# Patient Record
Sex: MALE | Race: WHITE | NOT HISPANIC OR LATINO | Employment: FULL TIME | URBAN - METROPOLITAN AREA
[De-identification: names, ages, dates, MRNs, and addresses within clinical notes are randomized per-mention and may not be internally consistent; named-entity substitution may affect disease eponyms.]

---

## 2022-10-28 ENCOUNTER — OFFICE VISIT (OUTPATIENT)
Dept: SPORTS MEDICINE | Facility: CLINIC | Age: 27
End: 2022-10-28

## 2022-10-28 VITALS
DIASTOLIC BLOOD PRESSURE: 79 MMHG | BODY MASS INDEX: 26.48 KG/M2 | HEART RATE: 82 BPM | TEMPERATURE: 98.6 F | SYSTOLIC BLOOD PRESSURE: 127 MMHG | HEIGHT: 70 IN | OXYGEN SATURATION: 97 % | WEIGHT: 185 LBS

## 2022-10-28 DIAGNOSIS — M25.661 DECREASED RANGE OF MOTION (ROM) OF RIGHT KNEE: ICD-10-CM

## 2022-10-28 DIAGNOSIS — M25.461 KNEE EFFUSION, RIGHT: ICD-10-CM

## 2022-10-28 DIAGNOSIS — M25.561 ACUTE PAIN OF RIGHT KNEE: Primary | ICD-10-CM

## 2022-10-28 PROCEDURE — 99203 OFFICE O/P NEW LOW 30 MIN: CPT | Performed by: STUDENT IN AN ORGANIZED HEALTH CARE EDUCATION/TRAINING PROGRAM

## 2022-10-28 NOTE — PROGRESS NOTES
"  Chief Complaint   Patient presents with   • Right Knee - Initial Evaluation       History of Present Illness  Stephen is a 27 y.o. year old male here today accompanied by his mom for right knee pain.  Injury occurred 10/27/2022 while playing basketball.  He is unsure of the exact mechanism but remembers running into another player.  He heard a pop and fell to the floor.  He had immediate pain and was unable to keep playing. Pain is currently rated 6/10 and described as a stabbing, shooting, and aching pain. Pain is located on the lateral aspect. Admits to associated tightness, stiffness, swelling, and bruising. He tried walking on it but feels like it is giving out.  Pain worsens with weight bearing, and even notices pain while lying down. He denies any numbness or tingling. Has been nonweightbearing on crutches and taking ibuprofen as needed.  He has a history significant for previous bilateral knee surgery for OCD about 15 years ago.  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    Review of Systems   All other systems reviewed and are negative.      /79 (BP Location: Left arm)   Pulse 82   Temp 98.6 °F (37 °C) (Temporal)   Ht 177.8 cm (70\")   Wt 83.9 kg (185 lb)   SpO2 97%   BMI 26.54 kg/m²        Physical Exam  Vital signs reviewed.   General: Well developed, well nourished; No acute distress.  Eyes: conjunctiva clear; pupils equally round and reactive  ENT: external ears and nose atraumatic; hearing normal  CV: no peripheral edema, 2+ distal pulses  Resp: normal respiratory effort, no use of accessory muscles  Skin: normal color and pigmentation; no rashes or wounds; normal turgor  Psych: alert and oriented; mood and affect appropriate; recent and remote memory intact  Neuro: sensation to light touch intact    MSK Exam:  The right knee is without obvious signs of acute bony deformity, there is a focal area of ecchymosis on the superior lateral aspect as well as " diffuse subtle ecchymosis on the medial aspect.  There is a significant joint effusion noted on visual inspection and confirmed with palpation. The patella is without tenderness. Apprehension is negative with medial and lateral glide. The medial joint line is nontender.  The lateral joint line has significant tenderness without bony crepitus or step-off. Soft tissue including the distal hamstring tendons, pes anserine, quad tendon, patellar tendon, proximal gastroc tendon, distal IT band, and Gerdy's tubercle are nontender.  Knee is kept in roughly 20 degrees of extension and still limited at maximum active extension to 10 degrees.  He has significant pain and limitations with flexion.  When supine will only get to roughly 60 degrees of flexion before significant guarding.  While seated is able to get to 80 degrees of flexion.  Special tests are limited due to guarding.  He has significant pain and guarding with attempted Lachman's and anterior drawer.  Varus & valgus stress with lateral joint line pain.  Unable to perform North's due to limitations with flexion and significant pain and guarding.  Unable to weight-bear.  Ambulating with use of crutches.    Right Knee X-Ray  Indication: Pain  Views: AP, Lateral, and [place]  Findings:  No acute fracture, dislocation, or malalignment  No bony lesion  Normal soft tissues  Large effusion  No prior studies were available for comparison.      Assessment and Plan  Diagnoses and all orders for this visit:    1. Acute pain of right knee (Primary)  -     MRI Knee Right Without Contrast; Future    2. Knee effusion, right  -     MRI Knee Right Without Contrast; Future    3. Decreased range of motion (ROM) of right knee  -     MRI Knee Right Without Contrast; Future    Patient is a 27-year-old male with acute right-sided knee pain after an injury while playing basketball yesterday.  He reports feeling a pop and has had feelings of giving way.  That combined with his  significant effusion, I am concerned about an occult fracture or internal derangement and therefore recommended MRI to further evaluate.  He should remain nonweightbearing with use the crutches.  He should ice and elevate above the level of his heart regularly to help with swelling.  He was also provided an Ace bandage for compression.  He may use over-the-counter Tylenol as needed for pain.  Stressed the importance of maintaining good extension to avoid loss of motion.  We discussed his capabilities and limitations at work.  He is concerned that this is a new job that he just started 1 month ago.  A note was provided allowing him to return to work light duty with strict instructions that he should remain nonweightbearing on the crutches.  He will follow-up 1 to 2 days after his MRI to review images.  Additional recommendations will be made at that time.  All of his and his mother's questions were answered and they are agreeable with the plan.      Dictated utilizing Dragon dictation.

## 2022-11-02 ENCOUNTER — PATIENT ROUNDING (BHMG ONLY) (OUTPATIENT)
Dept: SPORTS MEDICINE | Facility: CLINIC | Age: 27
End: 2022-11-02

## 2022-11-02 ENCOUNTER — APPOINTMENT (OUTPATIENT)
Dept: MRI IMAGING | Facility: HOSPITAL | Age: 27
End: 2022-11-02

## 2022-11-02 NOTE — PROGRESS NOTES
November 2, 2022    A Welcome Card has been sent to the patient for PATIENT ROUNDING with [facility]

## 2022-11-04 ENCOUNTER — OFFICE VISIT (OUTPATIENT)
Dept: SPORTS MEDICINE | Facility: CLINIC | Age: 27
End: 2022-11-04

## 2022-11-04 VITALS — HEIGHT: 75 IN | TEMPERATURE: 99.1 F | WEIGHT: 185 LBS | BODY MASS INDEX: 23 KG/M2

## 2022-11-04 DIAGNOSIS — S82.141A CLOSED FRACTURE OF RIGHT TIBIAL PLATEAU, INITIAL ENCOUNTER: Primary | ICD-10-CM

## 2022-11-04 DIAGNOSIS — M25.561 ACUTE PAIN OF RIGHT KNEE: ICD-10-CM

## 2022-11-04 PROCEDURE — 99213 OFFICE O/P EST LOW 20 MIN: CPT | Performed by: STUDENT IN AN ORGANIZED HEALTH CARE EDUCATION/TRAINING PROGRAM

## 2022-11-04 RX ORDER — TRAMADOL HYDROCHLORIDE 50 MG/1
50 TABLET ORAL EVERY 6 HOURS PRN
Qty: 12 TABLET | Refills: 0 | Status: SHIPPED | OUTPATIENT
Start: 2022-11-04

## 2022-11-09 NOTE — PROGRESS NOTES
New Right Knee      Patient: Stephen Matamoros        YOB: 1995    Medical Record Number: [id number]        Chief Complaints: right knee pain      History of Present Illness: This is a this is a 27-year-old male who injured his right knee playing basketball on 10/27/2022 he states he and a teammate collided he got a knee to the lateral aspect of his right knee he felt a pop had immediate pain and inability to bear weight.  He was seen x-rays were done which showed a questionable lateral tibial plateau fracture an MRI was done which confirmed that.  He does have a little depression of 1 particular area on the lateral tibial plateau it extends lateral and anterior.  His symptoms are significant mostly at night he is off work in a job he just started past medical history is unremarkable symptoms are moderate to severe      Allergies: No Known Allergies    Medications:   Home Medications:  Current Outpatient Medications on File Prior to Visit   Medication Sig   • traMADol (ULTRAM) 50 MG tablet Take 1 tablet by mouth Every 6 (Six) Hours As Needed for Severe Pain.     No current facility-administered medications on file prior to visit.     Current Medications:  Scheduled Meds:  Continuous Infusions:No current facility-administered medications for this visit.    PRN Meds:.    History reviewed. No pertinent past medical history.     Past Surgical History:   Procedure Laterality Date   • KNEE SURGERY Bilateral 2006        Social History     Occupational History     Employer: [employer]   Tobacco Use   • Smoking status: Never     Passive exposure: Never   • Smokeless tobacco: Current     Types: Chew   Vaping Use   • Vaping Use: Never used   Substance and Sexual Activity   • Alcohol use: Yes     Comment: social   • Drug use: Never   • Sexual activity: Defer      Social History     Social History Narrative   • Not on file      History reviewed. No pertinent family history.          Review of Systems:  "    Review of Systems      Physical Exam: 27 y.o. male  General Appearance:    Alert, cooperative, in no acute distress                   Vitals:    11/10/22 0806   Temp: 97.8 °F (36.6 °C)   Weight: 83.9 kg (185 lb)   Height: 177.8 cm (70\")   PainSc:   2      Patient is alert and read ×3 no acute distress appears her above-listed at height weight and age.  Affect is normal respiratory rate is normal unlabored. Heart rate regular rate rhythm, sclera, dentition and hearing are normal for the purpose of this exam.        Ortho Exam exam of the right knee he has a severe effusion he has tenderness over the lateral tibial plateau really was not able to assess ligamentous structures due to the acuteness of his symptoms but we do see those well on MRI.  He is neurologically intact he does have some swelling down the calf but no signs or symptoms of DVT    Procedures             Radiology:   AP, Lateral and merchant views of the right knee  were /reviewed to evauateknee pain.  You do appreciate changes in the lateral tibial plateau.  He also has an MRI which shows a lateral tibial plateau fracture with some depression particularly in one area it is depressed approximately 8 mm.  Ligamentous structures appear intact  Imaging Results (Most Recent)     Procedure Component Value Units Date/Time    XR Knee 1 or 2 View Right [[id number]] Resulted: 11/10/22 0755     Updated: 11/10/22 0755    Impression:      Ordering physician's impression is located in the Encounter Note dated 11/10/22. X-ray performed in the DR room.          Assessment/Plan:      Right lateral tibial plateau fracture I would like to get a CT scan to really evaluate how depressed this is to see if this needs surgical intervention.  In the meantime I will have him remain nonweightbearing he has not been icing I think that will help his symptoms he is only had tramadol I will give him a few hydrocodone with strict precautions we will get the CT scan he will remain " off work he understands hydrocodone is a narcotic and has addictive traits and he will watch the number that he takes and we will also watch this

## 2022-11-10 ENCOUNTER — OFFICE VISIT (OUTPATIENT)
Dept: ORTHOPEDIC SURGERY | Facility: CLINIC | Age: 27
End: 2022-11-10

## 2022-11-10 ENCOUNTER — TELEPHONE (OUTPATIENT)
Dept: ORTHOPEDIC SURGERY | Facility: CLINIC | Age: 27
End: 2022-11-10

## 2022-11-10 VITALS — WEIGHT: 185 LBS | TEMPERATURE: 97.8 F | HEIGHT: 70 IN | BODY MASS INDEX: 26.48 KG/M2

## 2022-11-10 DIAGNOSIS — M25.561 RIGHT KNEE PAIN, UNSPECIFIED CHRONICITY: Primary | ICD-10-CM

## 2022-11-10 DIAGNOSIS — S82.141A CLOSED FRACTURE OF RIGHT TIBIAL PLATEAU, INITIAL ENCOUNTER: Primary | ICD-10-CM

## 2022-11-10 DIAGNOSIS — S82.141A CLOSED FRACTURE OF RIGHT TIBIAL PLATEAU, INITIAL ENCOUNTER: ICD-10-CM

## 2022-11-10 PROCEDURE — 99204 OFFICE O/P NEW MOD 45 MIN: CPT | Performed by: ORTHOPAEDIC SURGERY

## 2022-11-10 PROCEDURE — 73560 X-RAY EXAM OF KNEE 1 OR 2: CPT | Performed by: ORTHOPAEDIC SURGERY

## 2022-11-10 RX ORDER — HYDROCODONE BITARTRATE AND ACETAMINOPHEN 5; 325 MG/1; MG/1
1 TABLET ORAL EVERY 4 HOURS PRN
Qty: 45 TABLET | Refills: 0 | Status: SHIPPED | OUTPATIENT
Start: 2022-11-10

## 2022-11-10 RX ORDER — HYDROCODONE BITARTRATE AND ACETAMINOPHEN 5; 325 MG/1; MG/1
1 TABLET ORAL EVERY 6 HOURS PRN
Qty: 45 TABLET | Refills: 0 | Status: SHIPPED | OUTPATIENT
Start: 2022-11-10

## 2022-11-10 NOTE — TELEPHONE ENCOUNTER
I spoke with the pharmacy they are requiring a new prescription with the correct instructions, sending asap toyou

## 2022-11-10 NOTE — TELEPHONE ENCOUNTER
These are both on the prescription: Sig: Take 1 tablet by mouth Every 6 (Six) Hours As Needed for Severe Pain. 1-2 oral Q4H PRN severe pain    Please advise as to which you would like. Thank you

## 2022-11-10 NOTE — TELEPHONE ENCOUNTER
Caller: SHAQ   Relationship to Patient: KEDAR     Phone Number: [phone]  Reason for Call:  QUESTIONS ABOUT THE MEDICATION THAT WAS SENT OVER BECAUSE THEIR ARE 2 SETS OF DOSING INFORMATION

## 2022-11-11 ENCOUNTER — DOCUMENTATION (OUTPATIENT)
Dept: ORTHOPEDIC SURGERY | Facility: CLINIC | Age: 27
End: 2022-11-11

## 2022-11-11 ENCOUNTER — TELEPHONE (OUTPATIENT)
Dept: ORTHOPEDIC SURGERY | Facility: CLINIC | Age: 27
End: 2022-11-11

## 2022-11-11 NOTE — TELEPHONE ENCOUNTER
Spoke with patient.      As per Dr. Corley, no surgery at this time.  Made follow up appt .  Patient voiced understanding

## 2022-11-11 NOTE — TELEPHONE ENCOUNTER
HUB AGENT ATTEMPTED CLINICAL WARM TRANSFER - NO ANSWER     Caller: Stephen Matamoros    Relationship: Self    Best call back number: [phone]     Caller requesting test results: PATIENT / SELF     What test was performed: RIGHT KNEE CT     When was the test performed: YESTERDAY 11-10-22     Where was the test performed: [facility] - REPORT IN MEDIA (& PATIENT HAS DISC)     Additional notes: PATIENT RETURNED MISSED CALL FROM DR MARMOLEJO FROM [facility] THIS MORNING 11-11-22 RE: RIGHT KNEE CT RESULTS (PLEASE SEE PRIOR CLINICAL DOCUMENTATION)     PLEASE CALL / LEAVE VMAIL x2 TO NOTIFY / DISCUSS CT RESULTS w/PATIENT     THANKS

## 2022-11-11 NOTE — PROGRESS NOTES
I spoke with radiology late last night regarding his CT scan.  It is pretty much as billed on the MRI.  He has about a maximum of 3 mm depression of that lateral tibial plateau fracture lateral meniscus other ligamentous structures all look fine

## 2022-11-28 ENCOUNTER — OFFICE VISIT (OUTPATIENT)
Dept: ORTHOPEDIC SURGERY | Facility: CLINIC | Age: 27
End: 2022-11-28

## 2022-11-28 VITALS — HEIGHT: 70 IN | TEMPERATURE: 98.1 F | BODY MASS INDEX: 25.77 KG/M2 | WEIGHT: 180 LBS

## 2022-11-28 DIAGNOSIS — S82.141D CLOSED FRACTURE OF RIGHT TIBIAL PLATEAU WITH ROUTINE HEALING, SUBSEQUENT ENCOUNTER: Primary | ICD-10-CM

## 2022-11-28 PROCEDURE — 99212 OFFICE O/P EST SF 10 MIN: CPT | Performed by: ORTHOPAEDIC SURGERY

## 2022-11-28 RX ORDER — CLINDAMYCIN PHOSPHATE 11.9 MG/ML
SOLUTION TOPICAL SEE ADMIN INSTRUCTIONS
COMMUNITY
Start: 2022-11-23

## 2022-11-28 RX ORDER — DOXYCYCLINE HYCLATE 100 MG/1
CAPSULE ORAL
COMMUNITY
Start: 2022-11-23

## 2022-11-28 NOTE — PROGRESS NOTES
Patient: Stephen Matamoros  YOB: 1995  Date of Service: 11/28/2022    Chief Complaints:   Chief Complaint   Patient presents with   • Right Knee - Follow-up, Pain       Subjective:    History of Present Illness: Pt is seen in the office today with complaints of right knee pain he is here for follow-up of his CT scan which demonstrates a 2 x 1.5 minimally comminuted minimally depressed lateral tibial plateau fracture he states he doing much better his knee feels better quads are waking up swelling is down he is in his brace and nonweightbearing it has been 4 weeks  Chief Complaint   Patient presents with   • Right Knee - Follow-up, Pain   .          Allergies: No Known Allergies    Medications:   Home Medications:  Current Outpatient Medications on File Prior to Visit   Medication Sig   • clindamycin (CLEOCIN T) 1 % external solution Apply  topically to the appropriate area as directed See Admin Instructions. Apply topically to the affected area twice daily   • doxycycline (VIBRAMYCIN) 100 MG capsule TAKE 1 CAPSULE BY MOUTH TWICE DAILY WITH A FULL GLASS OF WATER. DO NOT LIE DOWN UNTIL 1 HOUR AFTER TAKING   • traMADol (ULTRAM) 50 MG tablet Take 1 tablet by mouth Every 6 (Six) Hours As Needed for Severe Pain.   • HYDROcodone-acetaminophen (NORCO) 5-325 MG per tablet Take 1 tablet by mouth Every 6 (Six) Hours As Needed for Severe Pain. 1-2 oral Q4H PRN severe pain   • HYDROcodone-acetaminophen (NORCO) 5-325 MG per tablet Take 1 tablet by mouth Every 4 (Four) Hours As Needed for Severe Pain.     No current facility-administered medications on file prior to visit.     Current Medications:  Scheduled Meds:  Continuous Infusions:No current facility-administered medications for this visit.    PRN Meds:.    I have reviewed the patient's medical history in detail and updated the computerized patient record.  Review and summarization of old records include:    History reviewed. No pertinent past medical  history.     Past Surgical History:   Procedure Laterality Date   • KNEE SURGERY Bilateral 2006        Social History     Occupational History     Employer: [employer]   Tobacco Use   • Smoking status: Never     Passive exposure: Never   • Smokeless tobacco: Current     Types: Chew   Vaping Use   • Vaping Use: Never used   Substance and Sexual Activity   • Alcohol use: Yes     Comment: social   • Drug use: Never   • Sexual activity: Defer      Social History     Social History Narrative   • Not on file      History reviewed. No pertinent family history.    ROS: 14 point review of systems was performed and was negative except for documented findings in HPI and today's encounter.     Allergies: No Known Allergies  Constitutional:  Denies fever, shaking or chills   Eyes:  Denies change in visual acuity   HENT:  Denies nasal congestion or sore throat   Respiratory:  Denies cough or shortness of breath   Cardiovascular:  Denies chest pain or severe LE edema   GI:  Denies abdominal pain, nausea, vomiting, bloody stools or diarrhea   Musculoskeletal:  Numbness, tingling, or loss of motor function only as noted above in history of present illness.  : Denies painful urination or hematuria  Integument:  Denies rash, lesion or ulceration   Neurologic:  Denies headache or focal weakness  Endocrine:  Denies lymphadenopathy  Psych:  Denies confusion or change in mental status   Hem:  Denies active bleeding      Physical Exam: 27 y.o. male  Wt Readings from Last 3 Encounters:   11/28/22 81.6 kg (180 lb)   11/10/22 83.9 kg (185 lb)   11/04/22 83.9 kg (185 lb)       Body mass index is 25.83 kg/m².  Facility age limit for growth percentiles is 20 years.  Vitals:    11/28/22 0849   Temp: 98.1 °F (36.7 °C)     Vital signs reviewed.   General Appearance:    Alert, cooperative, in no acute distress                    Ortho exam    Exam of his knee no effusion no redness did open his brace he can flex to about 80 degrees with his  knee fully extended his quads will contract         CT is as above have reviewed and agree    Assessment: Right knee lateral tibial plateau fracture I think he can open up his brace and start moving this I still want no weight weightbearing for 2-3 weeks I will see him back at that time he can start some quad sets I do not want open chain knee extension which I discussed with him    Plan:   Follow up as indicated.  Ice, elevate, and rest as needed.  Discussed conservative measures of pain control including ice, bracing.  He can come out of his brace at night he can get back to the gym working on upper extremity  Gabykarl Corley M.D.

## 2022-12-22 ENCOUNTER — OFFICE VISIT (OUTPATIENT)
Dept: ORTHOPEDIC SURGERY | Facility: CLINIC | Age: 27
End: 2022-12-22

## 2022-12-22 VITALS — WEIGHT: 185.1 LBS | BODY MASS INDEX: 26.5 KG/M2 | TEMPERATURE: 97.6 F | HEIGHT: 70 IN

## 2022-12-22 DIAGNOSIS — S82.141D CLOSED FRACTURE OF RIGHT TIBIAL PLATEAU WITH ROUTINE HEALING, SUBSEQUENT ENCOUNTER: Primary | ICD-10-CM

## 2022-12-22 PROCEDURE — 99213 OFFICE O/P EST LOW 20 MIN: CPT | Performed by: ORTHOPAEDIC SURGERY

## 2022-12-22 PROCEDURE — 73560 X-RAY EXAM OF KNEE 1 OR 2: CPT | Performed by: ORTHOPAEDIC SURGERY

## 2022-12-22 NOTE — PROGRESS NOTES
"Knee Follow Up      Patient: Stephen Matamoros        YOB: 1995            Chief Complaints: knee pain right      History of Present Illness: Patient is here follow-up her right knee with a lateral tibial plateau fracture he states doing great he really has no pain he is working on range of motion still abiding by his nonweightbearing restrictions its been 8 weeks      Physical Exam: 27 y.o. male  General Appearance:    Alert, cooperative, in no acute distress                   Vitals:    12/22/22 0853   Temp: 97.6 °F (36.4 °C)   TempSrc: Temporal   Weight: 84 kg (185 lb 1.6 oz)   Height: 177.8 cm (70\")        Patient is alert and read ×3 no acute distress appears her above-listed at height weight and age.  Affect is normal respiratory rate is normal unlabored. Heart rate regular rate rhythm, sclera, dentition and hearing are normal for the purpose of this exam.      Ortho Exam     Exam of the right knee no effusion no redness she has good range of motion quads are actually quite good for the stage no tenderness laterally      X-rays AP and lateral of the right knee were taken to evaluate his fracture and compared to previous films despite the fact that I know the fractures are really do not appreciate any acute obvious differences    Assessment/Plan:    Right lateral tibial plateau fracture plan is for him to start progressing his weightbearing over the course of 2 weeks he can wean out of his brace I still do not want running working I will see him back in 4 weeks will not need an x-ray        " Declined

## 2022-12-30 ENCOUNTER — TREATMENT (OUTPATIENT)
Dept: PHYSICAL THERAPY | Facility: CLINIC | Age: 27
End: 2022-12-30

## 2022-12-30 DIAGNOSIS — R26.9 GAIT DISTURBANCE: ICD-10-CM

## 2022-12-30 DIAGNOSIS — R26.89 BALANCE PROBLEM: ICD-10-CM

## 2022-12-30 DIAGNOSIS — R29.898 WEAKNESS OF RIGHT LEG: ICD-10-CM

## 2022-12-30 DIAGNOSIS — S82.141A CLOSED FRACTURE OF RIGHT TIBIAL PLATEAU, INITIAL ENCOUNTER: Primary | ICD-10-CM

## 2022-12-30 PROCEDURE — 97161 PT EVAL LOW COMPLEX 20 MIN: CPT | Performed by: PHYSICAL THERAPIST

## 2022-12-30 NOTE — PROGRESS NOTES
"Physical Therapy Initial Evaluation and Plan of Care      Patient: Stephen Matamoros   : 1995  Diagnosis/ICD-10 Code:  Closed fracture of right tibial plateau, initial encounter [S82.141A]  Referring practitioner: Gaby Corley MD  Date of Initial Visit: 2022  Today's Date: 2022  Patient seen for 1 sessions         Visit Diagnoses:    ICD-10-CM ICD-9-CM   1. Closed fracture of right tibial plateau with routine healing  S82.141A 823.00   2. Weakness of right leg  R29.898 729.89   3. Balance problem  R26.89 781.99   4. Gait disturbance  R26.9 781.2         Subjective Evaluation    History of Present Illness  Mechanism of injury: Suresh was playing basketball on 10/27/2022, he planted his foot to get a rebound and another player hit the side of the R knee, pain immediately.    He had imaging done, found the lateral tibial plateua fracture, has been NWB for 8 weeks.    He had a follow up with Dr. Corley.  Per Dr. Corley's note on 22, \"Right lateral tibial plateau fracture plan is for him to start progressing his weightbearing over the course of 2 weeks he can wean out of his brace I still do not want running working\"    Suresh notes the R knee feels unstable, has a lot of his strength.  His pain levels are tolerable, only really noting pain at the end of the day or after long periods of WB.  He has no pain with WBAT.  He is still off work.     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 Pain  Quality: dull ache  Relieving factors: rest  Aggravating factors: stairs (standing or walking long distances, at the end of the day, coming down stairs)    Social Support  Lives in: multiple-level home  Lives with: parents (sibling)    Hand dominance: right    Diagnostic Tests  X-ray: abnormal  MRI studies: abnormal  CT scan: abnormal    Treatments  Previous treatment: physical therapy  Patient Goals  Patient goals for therapy: decreased pain, improved balance, increased " motion, increased strength, independence with ADLs/IADLs, return to sport/leisure activities and return to work             Objective           General Comments     Knee Comments  R knee AROM  Flexion: 125 degrees  Extension: 0 degrees    R knee MMT  Flexion: 4-/5  Extension: 4-/5    R hip MMT  Flexion: 4/5  Extension: 4/5  Abduction: 4/5  Adduction: 4+/5    WB Status: WBAT and patient is not using an assistive device, slight limp/decrease WB on the RLE    Swelling: mild swelling around the lateral patella, non pitting     Radicular Symptoms: tingling in the bottom of the R foot since he started WBAT, progressively getting better though    Tenderness: joint line lateral on the R         Assessment & Plan     Assessment  Impairments: abnormal gait, abnormal or restricted ROM, activity intolerance, impaired balance, impaired physical strength, lacks appropriate home exercise program and pain with function  Functional Limitations: lifting, sleeping, walking, uncomfortable because of pain, sitting, standing, stooping and unable to perform repetitive tasks  Assessment details: Pt presents with limitations, noted below, that impede his ability to perform work duties, running, hobbies, squatting, walking long distances, stair navigation going down. The skills of a therapist will be required to safely and effectively implement the following treatment plan to restore maximal level of function.       Goals  Plan Goals: KNEE PROBLEMS:   1. Mobility: Walking/Moving Around Functional Limitation     LTG 1: 12 weeks:  The patient will demonstrate limitation by achieving a score of 60/80 on the LEFS.    STATUS:  New   STG 1a: 4 weeks:  The patient will demonstrate limitation by achieving a score of 45/80 on the LEFS.      STATUS:  New   TREATMENT:  Manual therapy, therapeutic exercise, home exercise instruction, and modalities as needed.    2. The patient has limited strength of the right knee.   LTG 2: 12 weeks: The patient will  demonstrate 5 /5 strength for right knee flexion and extension in order to allow patient improved joint stability    STATUS:  New   STG 2a: 4 weeks: The patient will demonstrate 4+ /5 strength for right knee flexion and extension    STATUS:  New   STG2b:  4 weeks:  The patient will be independent with home exercises.     STATUS:  New   TREATMENT: Manual therapy, therapeutic exercise, home exercise instruction, aquatic therapy, and modalities as needed to include:  moist heat, electrical stimulation, ultrasound, and ice.     3. The patient has gait dysfunction.   LTG 3: 12 weeks:  The patient will ambulate without assistive device, independently, for community distances with minimal limp to the right lower extremity in order to improve mobility and allow patient to perform activities such as grocery shopping with greater ease.    STATUS:  New   TREATMENT: Gait training, aquatic therapy, therapeutic exercise, and home exercise instruction.          Plan  Therapy options: will be seen for skilled therapy services  Planned modality interventions: cryotherapy, dry needling, electrical stimulation/Russian stimulation, TENS and thermotherapy (hydrocollator packs)  Planned therapy interventions: ADL retraining, balance/weight-bearing training, body mechanics training, flexibility, functional ROM exercises, gait training, home exercise program, IADL retraining, joint mobilization, manual therapy, neuromuscular re-education, postural training, soft tissue mobilization, strengthening, stretching and therapeutic activities  Frequency: 2x week  Duration in weeks: 12  Treatment plan discussed with: patient  Plan details: Create an HEP, update as needed.    Progress with R knee/LE strength, trunk control/postural awareness, balance and gait training, coordination, increased stamina, decreased tightness, improved ROM/flexibility, education as needed.            History # of Personal Factors and/or Comorbidities: MODERATE  (1-2)  Examination of Body System(s): # of elements: LOW (1-2)  Clinical Presentation: STABLE   Clinical Decision Making: LOW     Timed:  Manual Therapy:         mins  09812;  Therapeutic Exercise:    4     mins  16067;     Neuromuscular Palomo:        mins  34959;    Therapeutic Activity:          mins  99819;     Gait Training:           mins  96996;     Ultrasound:          mins  91986;    Canalith Repos           ___  mins  91349      Untimed:  Electrical Stimulation:         mins  19699 ( );  Mechanical Traction:        mins  37518;   Dry Needling:                     mins self pay  Low Eval:                      30     mins  60658;  Medium Eval:                     mins  76345;   High Eval:                          mins  65190       Timed Treatment:   4   mins   Total Treatment:     34   mins    PT SIGNATURE: Claudia Bond PT, DPT  [place] License: [id number]  Electronically signed by Claudia Bond PT, 12/30/22, 7:45 AM EST      Initial Certification    Certification Period: 12/30/2022 thru 3/29/2023  I certify that the therapy services are furnished while this patient is under my care.  The services outlined above are required by this patient, and will be reviewed every 90 days.     PHYSICIAN: Gaby Corley MD  NPI: [id number]            PHYSICIAN PRINT NAME: ______________________________________________      PHYSICIAN SIGNATURE: ______________________________________________         DATE:________________________________        Please sign and return via fax to [phone].  Thank you, [facility].

## 2023-01-04 ENCOUNTER — TREATMENT (OUTPATIENT)
Dept: PHYSICAL THERAPY | Facility: CLINIC | Age: 28
End: 2023-01-04
Payer: COMMERCIAL

## 2023-01-04 DIAGNOSIS — R26.9 GAIT DISTURBANCE: ICD-10-CM

## 2023-01-04 DIAGNOSIS — S82.141A CLOSED FRACTURE OF RIGHT TIBIAL PLATEAU, INITIAL ENCOUNTER: Primary | ICD-10-CM

## 2023-01-04 DIAGNOSIS — R29.898 WEAKNESS OF RIGHT LEG: ICD-10-CM

## 2023-01-04 DIAGNOSIS — R26.89 BALANCE PROBLEM: ICD-10-CM

## 2023-01-04 PROCEDURE — 97110 THERAPEUTIC EXERCISES: CPT | Performed by: PHYSICAL THERAPIST

## 2023-01-04 PROCEDURE — 97530 THERAPEUTIC ACTIVITIES: CPT | Performed by: PHYSICAL THERAPIST

## 2023-01-04 PROCEDURE — 97112 NEUROMUSCULAR REEDUCATION: CPT | Performed by: PHYSICAL THERAPIST

## 2023-01-04 NOTE — PROGRESS NOTES
Physical Therapy Daily Treatment Note      Patient: Stephen Matamoros   : 1995  Referring practitioner: Gaby Corley MD  Date of Initial Visit: Type: THERAPY  Noted: 2022  Today's Date: 2023  Patient seen for 2 sessions           Visit Diagnoses:    ICD-10-CM ICD-9-CM   1. Closed fracture of right tibial plateau with routine healing  S82.141A 823.00   2. Weakness of right leg  R29.898 729.89   3. Balance problem  R26.89 781.99   4. Gait disturbance  R26.9 781.2       Subjective Evaluation    History of Present Illness    Subjective comment: 0/10 pain in the knee, exercises feel ok         Objective   See Exercise, Manual, and Modality Logs for complete treatment.       Assessment & Plan     Assessment  Impairments: abnormal gait, abnormal or restricted ROM, activity intolerance, impaired balance, impaired physical strength, lacks appropriate home exercise program and pain with function  Functional Limitations: lifting, sleeping, walking, uncomfortable because of pain, sitting, standing, stooping and unable to perform repetitive tasks  Assessment details: Suresh was able to tolerate all activities today, updated and provided him with an HEP.  He continues to lack strength in the RLE, but slowly progressing his gains.  There is decrease strength and control with step downs.  Added in level 1 step downs with PT guidance and control at quad.    Goals  Plan Goals: KNEE PROBLEMS:   1. Mobility: Walking/Moving Around Functional Limitation     LTG 1: 12 weeks:  The patient will demonstrate limitation by achieving a score of 60/80 on the LEFS.    STATUS:  Progressing   STG 1a: 4 weeks:  The patient will demonstrate limitation by achieving a score of 45/80 on the LEFS.      STATUS:  Progressing   TREATMENT:  Manual therapy, therapeutic exercise, home exercise instruction, and modalities as needed.    2. The patient has limited strength of the right knee.   LTG 2: 12 weeks: The patient will demonstrate 5 /5  strength for right knee flexion and extension in order to allow patient improved joint stability    STATUS:  Progressing   STG 2a: 4 weeks: The patient will demonstrate 4+ /5 strength for right knee flexion and extension    STATUS:  Progressing  STG2b:  4 weeks:  The patient will be independent with home exercises.     STATUS:  Progressing   TREATMENT: Manual therapy, therapeutic exercise, home exercise instruction, aquatic therapy, and modalities as needed to include:  moist heat, electrical stimulation, ultrasound, and ice.     3. The patient has gait dysfunction.   LTG 3: 12 weeks:  The patient will ambulate without assistive device, independently, for community distances with minimal limp to the right lower extremity in order to improve mobility and allow patient to perform activities such as grocery shopping with greater ease.    STATUS:  Progressing   TREATMENT: Gait training, aquatic therapy, therapeutic exercise, and home exercise instruction.          Plan  Therapy options: will be seen for skilled therapy services  Planned modality interventions: cryotherapy, dry needling, electrical stimulation/Russian stimulation, TENS and thermotherapy (hydrocollator packs)  Planned therapy interventions: ADL retraining, balance/weight-bearing training, body mechanics training, flexibility, functional ROM exercises, gait training, home exercise program, IADL retraining, joint mobilization, manual therapy, neuromuscular re-education, postural training, soft tissue mobilization, strengthening, stretching and therapeutic activities  Frequency: 2x week  Duration in weeks: 12  Treatment plan discussed with: patient  Plan details: Continue to progress his RLE strength, maintain ROM, and improve overall balance/gait.  Review his HEP, progress as tolerated.           Progress per Plan of Care and Progress strengthening /stabilization /functional activity           Timed:  Manual Therapy:    8     mins  75221;  Therapeutic  Exercise:    13     mins  40593;     Neuromuscular Palomo:    12    mins  58626;    Therapeutic Activity:     8     mins  09137;     Gait Training:           mins  48606;     Ultrasound:          mins  66145;    Canalith Repos           ___  mins  14329      Untimed:  Electrical Stimulation:         mins  98300 ( );  Mechanical Traction:         mins  82917;   Dry Needling:                     mins self pay       Timed Treatment:   41   mins   Total Treatment:     41   mins      Claudia Bond PT, LAZARA  [place] License #: [id number]

## 2023-01-06 ENCOUNTER — TREATMENT (OUTPATIENT)
Dept: PHYSICAL THERAPY | Facility: CLINIC | Age: 28
End: 2023-01-06
Payer: COMMERCIAL

## 2023-01-06 DIAGNOSIS — R29.898 WEAKNESS OF RIGHT LEG: ICD-10-CM

## 2023-01-06 DIAGNOSIS — R26.9 GAIT DISTURBANCE: ICD-10-CM

## 2023-01-06 DIAGNOSIS — S82.141A CLOSED FRACTURE OF RIGHT TIBIAL PLATEAU, INITIAL ENCOUNTER: Primary | ICD-10-CM

## 2023-01-06 DIAGNOSIS — R26.89 BALANCE PROBLEM: ICD-10-CM

## 2023-01-06 PROCEDURE — 97112 NEUROMUSCULAR REEDUCATION: CPT | Performed by: PHYSICAL THERAPIST

## 2023-01-06 PROCEDURE — 97530 THERAPEUTIC ACTIVITIES: CPT | Performed by: PHYSICAL THERAPIST

## 2023-01-06 PROCEDURE — 97110 THERAPEUTIC EXERCISES: CPT | Performed by: PHYSICAL THERAPIST

## 2023-01-06 NOTE — PROGRESS NOTES
Physical Therapy Daily Treatment Note      Patient: Stephen Matamoros   : 1995  Referring practitioner: Gaby Corley MD  Date of Initial Visit: Type: THERAPY  Noted: 2022  Today's Date: 2023  Patient seen for 3 sessions           Visit Diagnoses:    ICD-10-CM ICD-9-CM   1. Closed fracture of right tibial plateau with routine healing  S82.141A 823.00   2. Weakness of right leg  R29.898 729.89   3. Balance problem  R26.89 781.99   4. Gait disturbance  R26.9 781.2       Subjective Evaluation    History of Present Illness    Subjective comment: 0/10 pain, just soreness in the leg following last visit with just using muscles he hasn't been.  He has noticed his knee popping a couple times day/every day.  He noticies when his foot is planted and he turns, he feels it pop some.          Objective   See Exercise, Manual, and Modality Logs for complete treatment.       Assessment & Plan     Assessment  Impairments: abnormal gait, abnormal or restricted ROM, activity intolerance, impaired balance, impaired physical strength, lacks appropriate home exercise program and pain with function  Functional Limitations: lifting, sleeping, walking, uncomfortable because of pain, sitting, standing, stooping and unable to perform repetitive tasks  Assessment details: Suresh's ROM continues to be WFL, but he continues to lack strength in the RLE.  He was able to tolerate all strengthening exercises today, no complaints of pain or discomfort, more just weakness.  Step downs have improved from last session.  He did note that he has noticed some popping when he plants his foot and turns, but it is only intermittent.     Goals  Plan Goals: KNEE PROBLEMS:   1. Mobility: Walking/Moving Around Functional Limitation     LTG 1: 12 weeks:  The patient will demonstrate limitation by achieving a score of 60/80 on the LEFS.    STATUS:  Progressing   STG 1a: 4 weeks:  The patient will demonstrate limitation by achieving a score of  45/80 on the LEFS.      STATUS:  Progressing   TREATMENT:  Manual therapy, therapeutic exercise, home exercise instruction, and modalities as needed.    2. The patient has limited strength of the right knee.   LTG 2: 12 weeks: The patient will demonstrate 5 /5 strength for right knee flexion and extension in order to allow patient improved joint stability    STATUS:  Progressing   STG 2a: 4 weeks: The patient will demonstrate 4+ /5 strength for right knee flexion and extension    STATUS:  Progressing  STG2b:  4 weeks:  The patient will be independent with home exercises.     STATUS:  Progressing   TREATMENT: Manual therapy, therapeutic exercise, home exercise instruction, aquatic therapy, and modalities as needed to include:  moist heat, electrical stimulation, ultrasound, and ice.     3. The patient has gait dysfunction.   LTG 3: 12 weeks:  The patient will ambulate without assistive device, independently, for community distances with minimal limp to the right lower extremity in order to improve mobility and allow patient to perform activities such as grocery shopping with greater ease.    STATUS:  Progressing   TREATMENT: Gait training, aquatic therapy, therapeutic exercise, and home exercise instruction.          Plan  Therapy options: will be seen for skilled therapy services  Planned modality interventions: cryotherapy, dry needling, electrical stimulation/Russian stimulation, TENS and thermotherapy (hydrocollator packs)  Planned therapy interventions: ADL retraining, balance/weight-bearing training, body mechanics training, flexibility, functional ROM exercises, gait training, home exercise program, IADL retraining, joint mobilization, manual therapy, neuromuscular re-education, postural training, soft tissue mobilization, strengthening, stretching and therapeutic activities  Frequency: 2x week  Duration in weeks: 12  Treatment plan discussed with: patient  Plan details: Progress his RLE strength, improve  overall balance/gait, HEP progressions as tolerated.           Progress per Plan of Care and Progress strengthening /stabilization /functional activity           Timed:  Manual Therapy:         mins  58243;  Therapeutic Exercise:    15     mins  95227;     Neuromuscular Palomo:    12    mins  71815;    Therapeutic Activity:     8     mins  62570;     Gait Training:           mins  39167;     Ultrasound:          mins  46048;    Canalith Repos           ___  mins  39108      Untimed:  Electrical Stimulation:         mins  17011 ( );  Mechanical Traction:         mins  02846;   Dry Needling:                     mins self pay       Timed Treatment:   35   mins   Total Treatment:     35   mins      Claudia Bond PT, DPT  [place] License #: [id number]

## 2023-01-10 ENCOUNTER — TREATMENT (OUTPATIENT)
Dept: PHYSICAL THERAPY | Facility: CLINIC | Age: 28
End: 2023-01-10
Payer: COMMERCIAL

## 2023-01-10 DIAGNOSIS — R26.89 BALANCE PROBLEM: ICD-10-CM

## 2023-01-10 DIAGNOSIS — S82.141A CLOSED FRACTURE OF RIGHT TIBIAL PLATEAU, INITIAL ENCOUNTER: Primary | ICD-10-CM

## 2023-01-10 DIAGNOSIS — R26.9 GAIT DISTURBANCE: ICD-10-CM

## 2023-01-10 DIAGNOSIS — R29.898 WEAKNESS OF RIGHT LEG: ICD-10-CM

## 2023-01-10 PROCEDURE — 97110 THERAPEUTIC EXERCISES: CPT | Performed by: PHYSICAL THERAPIST

## 2023-01-10 PROCEDURE — 97530 THERAPEUTIC ACTIVITIES: CPT | Performed by: PHYSICAL THERAPIST

## 2023-01-10 PROCEDURE — 97112 NEUROMUSCULAR REEDUCATION: CPT | Performed by: PHYSICAL THERAPIST

## 2023-01-10 NOTE — PROGRESS NOTES
Physical Therapy Daily Treatment Note      Patient: Stephen Matamoros   : 1995  Referring practitioner: Gaby Corley MD  Date of Initial Visit: Type: THERAPY  Noted: 2022  Today's Date: 1/10/2023  Patient seen for 4 sessions           Visit Diagnoses:    ICD-10-CM ICD-9-CM   1. Closed fracture of right tibial plateau with routine healing  S82.141A 823.00   2. Weakness of right leg  R29.898 729.89   3. Balance problem  R26.89 781.99   4. Gait disturbance  R26.9 781.2       Subjective Evaluation    History of Present Illness    Subjective comment: 0/10 pain today.  He bent down to  his phone yesterday that fell on the floor, his R knee popped, gave out on him, but he caught himself.  He had pain with the pop, but the pain went away.           Objective   See Exercise, Manual, and Modality Logs for complete treatment.       Assessment & Plan     Assessment  Impairments: abnormal gait, abnormal or restricted ROM, activity intolerance, impaired balance, impaired physical strength, lacks appropriate home exercise program and pain with function  Functional Limitations: lifting, sleeping, walking, uncomfortable because of pain, sitting, standing, stooping and unable to perform repetitive tasks  Assessment details: Suresh's ROM continues to be WFL.  He was able to tolerate all strengthening exercises today in pain free ranges.  Balance work added today, quad control continues to improve.  SLS tolerated today.  Unlevel surface activities performed without any sway or discomfort.   He continues to have popping when he plants his foot and turns, but it is only intermittent.  No pain during session with any activities.     Goals  Plan Goals: KNEE PROBLEMS:   1. Mobility: Walking/Moving Around Functional Limitation     LTG 1: 12 weeks:  The patient will demonstrate limitation by achieving a score of 60/80 on the LEFS.    STATUS:  Progressing   STG 1a: 4 weeks:  The patient will demonstrate limitation by  achieving a score of 45/80 on the LEFS.      STATUS:  Progressing   TREATMENT:  Manual therapy, therapeutic exercise, home exercise instruction, and modalities as needed.    2. The patient has limited strength of the right knee.   LTG 2: 12 weeks: The patient will demonstrate 5 /5 strength for right knee flexion and extension in order to allow patient improved joint stability    STATUS:  Progressing   STG 2a: 4 weeks: The patient will demonstrate 4+ /5 strength for right knee flexion and extension    STATUS:  Progressing  STG2b:  4 weeks:  The patient will be independent with home exercises.     STATUS:  Progressing   TREATMENT: Manual therapy, therapeutic exercise, home exercise instruction, aquatic therapy, and modalities as needed to include:  moist heat, electrical stimulation, ultrasound, and ice.     3. The patient has gait dysfunction.   LTG 3: 12 weeks:  The patient will ambulate without assistive device, independently, for community distances with minimal limp to the right lower extremity in order to improve mobility and allow patient to perform activities such as grocery shopping with greater ease.    STATUS:  Progressing   TREATMENT: Gait training, aquatic therapy, therapeutic exercise, and home exercise instruction.          Plan  Therapy options: will be seen for skilled therapy services  Planned modality interventions: cryotherapy, dry needling, electrical stimulation/Russian stimulation, TENS and thermotherapy (hydrocollator packs)  Planned therapy interventions: ADL retraining, balance/weight-bearing training, body mechanics training, flexibility, functional ROM exercises, gait training, home exercise program, IADL retraining, joint mobilization, manual therapy, neuromuscular re-education, postural training, soft tissue mobilization, strengthening, stretching and therapeutic activities  Frequency: 2x week  Duration in weeks: 12  Treatment plan discussed with: patient  Plan details: Continue to make  gains with RLE strength, improve overall balance/gait, HEP progressions as tolerated, monitor popping in the knee.     He goes next week for a follow up with ortho office.           Progress per Plan of Care and Progress strengthening /stabilization /functional activity           Timed:  Manual Therapy:         mins  37121;  Therapeutic Exercise:    14     mins  89592;     Neuromuscular Palomo:    11    mins  63873;    Therapeutic Activity:     8     mins  11110;     Gait Training:           mins  29631;     Ultrasound:          mins  05644;    Canalith Repos           ___  mins  15942      Untimed:  Electrical Stimulation:         mins  33281 ( );  Mechanical Traction:        mins  76444;   Dry Needling:                     mins self pay       Timed Treatment:   33   mins   Total Treatment:     33   mins      Claudia Bond, PT, DPT  [place] License #: [id number]

## 2023-01-13 ENCOUNTER — TREATMENT (OUTPATIENT)
Dept: PHYSICAL THERAPY | Facility: CLINIC | Age: 28
End: 2023-01-13
Payer: COMMERCIAL

## 2023-01-13 DIAGNOSIS — S82.141A CLOSED FRACTURE OF RIGHT TIBIAL PLATEAU, INITIAL ENCOUNTER: Primary | ICD-10-CM

## 2023-01-13 DIAGNOSIS — R29.898 WEAKNESS OF RIGHT LEG: ICD-10-CM

## 2023-01-13 DIAGNOSIS — R26.9 GAIT DISTURBANCE: ICD-10-CM

## 2023-01-13 DIAGNOSIS — R26.89 BALANCE PROBLEM: ICD-10-CM

## 2023-01-13 PROCEDURE — 97530 THERAPEUTIC ACTIVITIES: CPT | Performed by: PHYSICAL THERAPIST

## 2023-01-13 PROCEDURE — 97112 NEUROMUSCULAR REEDUCATION: CPT | Performed by: PHYSICAL THERAPIST

## 2023-01-13 NOTE — PROGRESS NOTES
Physical Therapy Daily Treatment Note      Patient: Stephen Matamoros   : 1995  Referring practitioner: Gaby Corley MD  Date of Initial Visit: Type: THERAPY  Noted: 2022  Today's Date: 2023  Patient seen for 5 sessions           Subjective Evaluation    History of Present Illness    Subjective comment: 0/10       Objective           General Comments     Knee Comments  Some muscle atrophy noted in the R LE.     See Exercise, Manual, and Modality Logs for complete treatment.       Assessment & Plan     Assessment  Impairments: abnormal gait, abnormal or restricted ROM, activity intolerance, impaired balance, impaired physical strength, lacks appropriate home exercise program and pain with function  Functional Limitations: lifting, sleeping, walking, uncomfortable because of pain, sitting, standing, stooping and unable to perform repetitive tasks  Assessment details: Balance activities that allowed for greater muscle recruitment were started at this tx visit. Increased difficulty with single leg balance activities were performed. Hip strengthening was cont to allow for greater knee control. Pt con to have the feeling of instability in the knee when descending steps. Overall all tx was tolerated without issue.    Goals  Plan Goals: KNEE PROBLEMS:   1. Mobility: Walking/Moving Around Functional Limitation     LTG 1: 12 weeks:  The patient will demonstrate limitation by achieving a score of 60/80 on the LEFS.    STATUS:  Progressing   STG 1a: 4 weeks:  The patient will demonstrate limitation by achieving a score of 45/80 on the LEFS.      STATUS:  Progressing   TREATMENT:  Manual therapy, therapeutic exercise, home exercise instruction, and modalities as needed.    2. The patient has limited strength of the right knee.   LTG 2: 12 weeks: The patient will demonstrate 5 /5 strength for right knee flexion and extension in order to allow patient improved joint stability    STATUS:  Progressing   STG  2a: 4 weeks: The patient will demonstrate 4+ /5 strength for right knee flexion and extension    STATUS:  Progressing  STG2b:  4 weeks:  The patient will be independent with home exercises.     STATUS:  Progressing   TREATMENT: Manual therapy, therapeutic exercise, home exercise instruction, aquatic therapy, and modalities as needed to include:  moist heat, electrical stimulation, ultrasound, and ice.     3. The patient has gait dysfunction.   LTG 3: 12 weeks:  The patient will ambulate without assistive device, independently, for community distances with minimal limp to the right lower extremity in order to improve mobility and allow patient to perform activities such as grocery shopping with greater ease.    STATUS:  Progressing   TREATMENT: Gait training, aquatic therapy, therapeutic exercise, and home exercise instruction.          Plan  Therapy options: will be seen for skilled therapy services  Planned modality interventions: cryotherapy, dry needling, electrical stimulation/Russian stimulation, TENS and thermotherapy (hydrocollator packs)  Planned therapy interventions: ADL retraining, balance/weight-bearing training, body mechanics training, flexibility, functional ROM exercises, gait training, home exercise program, IADL retraining, joint mobilization, manual therapy, neuromuscular re-education, postural training, soft tissue mobilization, strengthening, stretching and therapeutic activities  Frequency: 2x week  Duration in weeks: 12  Treatment plan discussed with: patient  Plan details: Continue to make gains with RLE strength, improve overall balance/gait, HEP progressions as tolerated, monitor popping in the knee.     He goes next week for a follow up with ortho office.           Visit Diagnoses:    ICD-10-CM ICD-9-CM   1. Closed fracture of right tibial plateau with routine healing  S82.141A 823.00   2. Weakness of right leg  R29.898 729.89   3. Balance problem  R26.89 781.99   4. Gait disturbance   R26.9 781.2       Progress per Plan of Care    Timed:    Therapeutic Exercise:    11     mins  52972;  Manual Therapy:         mins  77001;     Neuromuscular Palomo:   8    mins  00084;    Therapeutic Activity:     12     mins  87263;     Gait Training:           mins  85993;     Ultrasound:          mins  92479;      Untimed:  Electrical Stimulation:         mins  77098 ( );  Mechanical Traction:         mins  90832;     Timed Treatment:   31   mins   Total Treatment:     33   mins      Rosie Velarde PTA  Physical Therapist Assistant

## 2023-01-17 ENCOUNTER — TREATMENT (OUTPATIENT)
Dept: PHYSICAL THERAPY | Facility: CLINIC | Age: 28
End: 2023-01-17
Payer: COMMERCIAL

## 2023-01-17 DIAGNOSIS — R26.9 GAIT DISTURBANCE: ICD-10-CM

## 2023-01-17 DIAGNOSIS — R26.89 BALANCE PROBLEM: ICD-10-CM

## 2023-01-17 DIAGNOSIS — R29.898 WEAKNESS OF RIGHT LEG: ICD-10-CM

## 2023-01-17 DIAGNOSIS — S82.141A CLOSED FRACTURE OF RIGHT TIBIAL PLATEAU, INITIAL ENCOUNTER: Primary | ICD-10-CM

## 2023-01-17 PROCEDURE — 97112 NEUROMUSCULAR REEDUCATION: CPT | Performed by: PHYSICAL THERAPIST

## 2023-01-17 PROCEDURE — 97530 THERAPEUTIC ACTIVITIES: CPT | Performed by: PHYSICAL THERAPIST

## 2023-01-17 NOTE — PROGRESS NOTES
Physical Therapy Daily Treatment Note      Patient: Stephen Matamoros   : 1995  Referring practitioner: Gaby Corley MD  Date of Initial Visit: Type: THERAPY  Noted: 2022  Today's Date: 2023  Patient seen for 6 sessions           Subjective Evaluation    History of Present Illness    Subjective comment: 0/10       Objective   See Exercise, Manual, and Modality Logs for complete treatment.       Assessment & Plan     Assessment  Impairments: abnormal gait, abnormal or restricted ROM, activity intolerance, impaired balance, impaired physical strength, lacks appropriate home exercise program and pain with function  Functional Limitations: lifting, sleeping, walking, uncomfortable because of pain, sitting, standing, stooping and unable to perform repetitive tasks  Assessment details: Pt to see MD on Thursday and will call if he needs to cont with tx. Balance and NM training was the main focus of tx today. Pt educated on maintaining strength, balance and flexibly. There was some difficulty with the SLS on upside down BOSU but was able to show greater ability at the last try. All tx was tolerated without issue.    Goals  Plan Goals: KNEE PROBLEMS:   1. Mobility: Walking/Moving Around Functional Limitation     LTG 1: 12 weeks:  The patient will demonstrate limitation by achieving a score of 60/80 on the LEFS.    STATUS:  Progressing   STG 1a: 4 weeks:  The patient will demonstrate limitation by achieving a score of 45/80 on the LEFS.      STATUS:  Progressing   TREATMENT:  Manual therapy, therapeutic exercise, home exercise instruction, and modalities as needed.    2. The patient has limited strength of the right knee.   LTG 2: 12 weeks: The patient will demonstrate 5 /5 strength for right knee flexion and extension in order to allow patient improved joint stability    STATUS:  Progressing   STG 2a: 4 weeks: The patient will demonstrate 4+ /5 strength for right knee flexion and extension    STATUS:   Progressing  STG2b:  4 weeks:  The patient will be independent with home exercises.     STATUS:  Progressing   TREATMENT: Manual therapy, therapeutic exercise, home exercise instruction, aquatic therapy, and modalities as needed to include:  moist heat, electrical stimulation, ultrasound, and ice.     3. The patient has gait dysfunction.   LTG 3: 12 weeks:  The patient will ambulate without assistive device, independently, for community distances with minimal limp to the right lower extremity in order to improve mobility and allow patient to perform activities such as grocery shopping with greater ease.    STATUS:  Progressing   TREATMENT: Gait training, aquatic therapy, therapeutic exercise, and home exercise instruction.          Plan  Therapy options: will be seen for skilled therapy services  Planned modality interventions: cryotherapy, dry needling, electrical stimulation/Russian stimulation, TENS and thermotherapy (hydrocollator packs)  Planned therapy interventions: ADL retraining, balance/weight-bearing training, body mechanics training, flexibility, functional ROM exercises, gait training, home exercise program, IADL retraining, joint mobilization, manual therapy, neuromuscular re-education, postural training, soft tissue mobilization, strengthening, stretching and therapeutic activities  Frequency: 2x week  Duration in weeks: 12  Treatment plan discussed with: patient  Plan details: Cont to address strength and balance activities to challenge pt to show greater stability in the LE.          Visit Diagnoses:    ICD-10-CM ICD-9-CM   1. Closed fracture of right tibial plateau with routine healing  S82.141A 823.00   2. Weakness of right leg  R29.898 729.89   3. Balance problem  R26.89 781.99   4. Gait disturbance  R26.9 781.2       Progress per Plan of Care    Timed:    Therapeutic Exercise:    10     mins  37765;  Manual Therapy:         mins  39251;     Neuromuscular Palomo:   10    mins  48084;    Therapeutic  Activity:     10     mins  00344;     Gait Training:           mins  59054;     Ultrasound:          mins  46844;      Untimed:  Electrical Stimulation:         mins  70523 ( );  Mechanical Traction:         mins  89964;     Timed Treatment:   31   mins   Total Treatment:     33   mins      Rosie Velarde PTA  Physical Therapist Assistant

## 2023-01-19 ENCOUNTER — OFFICE VISIT (OUTPATIENT)
Dept: ORTHOPEDIC SURGERY | Facility: CLINIC | Age: 28
End: 2023-01-19
Payer: COMMERCIAL

## 2023-01-19 VITALS — HEIGHT: 70 IN | BODY MASS INDEX: 26.5 KG/M2 | WEIGHT: 185.1 LBS | TEMPERATURE: 97.4 F

## 2023-01-19 DIAGNOSIS — Z09 FRACTURE FOLLOW-UP: Primary | ICD-10-CM

## 2023-01-19 PROCEDURE — 99212 OFFICE O/P EST SF 10 MIN: CPT | Performed by: ORTHOPAEDIC SURGERY

## 2023-01-19 NOTE — LETTER
January 19, 2023     Patient: Stephen Matamoros   YOB: 1995   Date of Visit: 1/19/2023       To Whom It May Concern:    It is my medical opinion that Stephen Matamoros can return to work on January 23, 2022 with no restrictions           Sincerely,        Beata Corley MD    CC:   No Recipients

## 2023-02-27 ENCOUNTER — DOCUMENTATION (OUTPATIENT)
Dept: PHYSICAL THERAPY | Facility: CLINIC | Age: 28
End: 2023-02-27
Payer: COMMERCIAL

## 2023-02-27 DIAGNOSIS — S82.141A CLOSED FRACTURE OF RIGHT TIBIAL PLATEAU, INITIAL ENCOUNTER: Primary | ICD-10-CM

## 2023-02-27 DIAGNOSIS — R26.9 GAIT DISTURBANCE: ICD-10-CM

## 2023-02-27 DIAGNOSIS — R29.898 WEAKNESS OF RIGHT LEG: ICD-10-CM

## 2023-02-27 DIAGNOSIS — R26.89 BALANCE PROBLEM: ICD-10-CM

## 2023-02-27 NOTE — PROGRESS NOTES
Discharge Summary  Discharge Summary from Physical Therapy Report    Patient Information  Stephen Matamoros  1995  Diagnosis/ICD - 10 Code:  Closed fracture of right tibial plateau, initial encounter [S82.141A]  Referring practitoner: No ref. provider found  Date of initial visit: 2/27/2023  Today's date: 2/27/2023  Patient was seen for Visit count could not be calculated. Make sure you are using a visit which is associated with an episode. sessions      Visit Diagnoses:    ICD-10-CM ICD-9-CM   1. Closed fracture of right tibial plateau with routine healing  S82.141A 823.00   2. Weakness of right leg  R29.898 729.89   3. Balance problem  R26.89 781.99   4. Gait disturbance  R26.9 781.2       Discharge Status of Patient: See MD Note dated 1/19/23      Comments:  Suresh was last seen in PT on 1/17/23, then had a follow up with ortho office on 1/1923.  Based on the note with Dr. Corley, he was released back to work on 1/23/23.  He never called PT office back for further appts, canceled his appt on 1/20/23.  He will be discharged from PT services at this time.           Claudia Bond, PT, DPT  [place] License #: [id number]

## 2025-06-26 ENCOUNTER — OFFICE VISIT (OUTPATIENT)
Dept: SLEEP MEDICINE | Facility: HOSPITAL | Age: 30
End: 2025-06-26
Payer: COMMERCIAL

## 2025-06-26 VITALS
SYSTOLIC BLOOD PRESSURE: 147 MMHG | HEIGHT: 70 IN | OXYGEN SATURATION: 97 % | HEART RATE: 61 BPM | WEIGHT: 199 LBS | BODY MASS INDEX: 28.49 KG/M2 | DIASTOLIC BLOOD PRESSURE: 74 MMHG

## 2025-06-26 DIAGNOSIS — G47.8 NON-RESTORATIVE SLEEP: ICD-10-CM

## 2025-06-26 DIAGNOSIS — G47.30 OBSERVED SLEEP APNEA: Primary | ICD-10-CM

## 2025-06-26 DIAGNOSIS — G47.19 EXCESSIVE DAYTIME SLEEPINESS: ICD-10-CM

## 2025-06-26 DIAGNOSIS — R06.83 SNORING: ICD-10-CM

## 2025-06-26 PROCEDURE — 99204 OFFICE O/P NEW MOD 45 MIN: CPT | Performed by: INTERNAL MEDICINE

## 2025-06-26 PROCEDURE — G0463 HOSPITAL OUTPT CLINIC VISIT: HCPCS

## 2025-06-26 NOTE — PROGRESS NOTES
[facility]  Sleep Medicine   [address]  [address]  [place] [address]  Phone   Fax       Stephen Matamoros  [id number]   1995  29 y.o.  male      PCP Stephen Woodruff MD    Type of service: Initial Sleep Medicine Consult.  Date of service: 6/26/2025      Chief Complaint   Patient presents with    Witnessed Apnea    Snoring    Non-restorative Sleep    Fatigue    Dry Mouth    Daytime Sleepiness       History of present illness;  Thank you for asking to see Stephen Matamoros, 29 y.o.. The patient was seen today on 6/26/2025 at [facility].  The patient presents today with symptoms of snoring, non-restorative sleep and witnessed apneas. The symptoms are present for 4 years and they are persistent in nature.  The snoring is present in all positions and it is loud.  Patient has no prior surgery namely tonsillectomy, nasal surgery and UPPP.   He works as a industrial maintenance.  He tells me that he has always had daytime excessive sleepiness even when he was in college.  No family history of narcolepsy.  Patient denies sleep paralysis, hallucinations and cataplexy symptoms    Patient gives the following sleep history.  Sleep schedule:  Bedtime: 9 PM  Wake time: 5 AM  Normally takes about 30-60 minutes to fall asleep  Average hours of sleep 7  Number of naps per day 2  Symptoms   In addition to snoring, nonrestorative sleep and witnessed apneas patient gives the following associated symptoms.  Have you ever awakened gasping for breath, coughing, choking: Yes   Change in weight,  No   Morning headaches  No   Awaken with a sore throat or dry mouth  Yes   Leg jerking at night:  Yes   Crawly feeling/urge sensation to move in the legs: No   Teeth grinding:Yes   Have you ever awakened at night with a sour taste or burning sensation in your chest:  No   Do you have muscle weakness with laughing or anger or sleep paralysis:  No   Have you ever felt  "paralyzed while going to sleep or waking up:  No   Sleepwalking, nightmares, No   Nocturia (urination at night): 2 times per night  Memory Problem:No     Adapted from STOP-BANG Questionnaire  Thomas F et al. Anesthesiology 2008;108:812-21.    Does the patient SNORE? Yes    Does the patient feel TIRED, fatigued or sleepy during the day? Yes    Has anyone OBSERVED the stop breathing or cough/gasp during sleep? Yes    Does the patient have high blood PRESSURE? No    Is the patient's BMI greater than 35? No  Body mass index is 28.55 kg/m².   Is the patient’s AGE over 50 years old? No  29 y.o.   Is the patient's NECK size greater than 17 in for a male and 16 in for a female? No  Neck Circumference: 15 inches   Is the patient’s GENDER male? Yes       Total \"Yes\" Responses; 4    0-2 \"Yes\" Responses = Low Risk of VICKIE  3-4 \"Yes\" Responses = Intermediate Risk of VICKIE  5-8 \"Yes\" Responses = High Risk VICKIE    MEDICAL CONDITIONS (PMH)   Daytime excessive sleepiness    Social history:  Do you drive a commercial vehicle:  No   Shift work:  No   Tobacco use:  Yes   Alcohol use: 20 per week  Caffeinated drinks: 3      Family Hx (parents and siblings) (pertaining to sleep medicine)  Sleep apnea both parents  No history of narcolepsy    Medications: reviewed    Review of systems:  Positive symptoms are :  Snoring  Witnessed apnea  Daytime excessive sleepiness with Ages [place] Sleepiness Scale of Total score: 14   Fatigue       Physical exam:  CONSTITUTINONAL:  Vitals:    06/26/25 1447   BP: 147/74   Pulse: 61   SpO2: 97%   Weight: 90.3 kg (199 lb)   Height: 177.8 cm (70\")    Body mass index is 28.55 kg/m².   NOSE:no nasal septal defects, nasal passages are clear, no nasal polyps,   THROAT: tonsils are not enlarged, tongue normal size, oral airway Mallampati class 3  NECK:Neck Circumference: 15 inches, trachea is in the midline, thyroid not enlarged  RESPIRATORY SYSTEM: Breath sounds are normal, there are no wheezes  CARDIOVASULAR SYSTEM: " Heart sounds are regular rhythm and normal rate, no edema  NEUROLOGICAL SYSTEM: Oriented x 3, No speech defect, gait is normal  PSYCHIATRIC SYSTEM: Mood is normal, thought content is normal      Assessment and plan:  Witnessed apneas,(R06.81) patient's symptoms and examination is consistent with sleep apnea (G47.30). I have talked to the patient about the signs and symptoms of sleep apnea. In addition, I have also discussed pathophysiology of sleep apnea.  I also discussed the complications of untreated sleep apnea including effects on hypertension, diabetes mellitus and nonrestorative sleep with hypersomnia which can increase risk for motor vehicle accidents.  Untreated sleep apnea is also a risk factor for development of atrial fibrillation, pulmonary hypertension, and insulin resistance and stroke.  Discussed in detail of various testing methods including home-based and lab based sleep studies.  Based on history and physical examination and other comorbidities the most appropriate study is home sleep test.  The order for the sleep study is placed in [facility].  The test will be scheduled after approval from insurance. Treatment and management will be discussed after the test is completed.  Patient was given opportunity to ask questions and all the questions were answered.  If the home sleep test is negative and had to proceed to MSLT to rule out narcolepsy or/hypersomnia  Snoring (R06.83), snoring is the sound created by turbulent airflow vibrating upper airway soft tissue due to limitation of inspiratory airflow. I have also discussed factors affecting snoring including sleep deprivation, sleeping on the back and alcohol ingestion. To minimize snoring, patient is advised to have adequate sleep, sleep on the side and avoid alcohol and sedative medications before bedtime  Daytime excessive sleepiness .  It was assessed with Lucas Sleepiness Scale of Total score: 14.  There are many causes for daytime excessive  sleepiness including sleep depression, shiftwork syndrome, depression and other medical disorders including heart, kidney and liver failure.  The most serious cause of excessive sleepiness is due to neurological conditions like narcolepsy/cataplexy.  But the most common cause of excessive sleepiness is due to sleep apnea with frequent awakenings during sleep time.  I have discussed safety of driving and to remain vigilant while driving.  Chronic fatigue,    Return for 31 to 90 days after PAP setup with down load..  Patient's questions were answered      I once again thank you for asking me to see this patient in consultation and I have forwarded my opinion and treatment plan.  Please do not hesitate to call me if you have any questions.   6/26/2025  Peter Sanabria MD  Sleep Medicine  Medical Director  [facility]

## 2025-08-26 ENCOUNTER — TELEPHONE (OUTPATIENT)
Dept: SLEEP MEDICINE | Facility: HOSPITAL | Age: 30
End: 2025-08-26
Payer: COMMERCIAL